# Patient Record
Sex: MALE | Race: WHITE | NOT HISPANIC OR LATINO | URBAN - NONMETROPOLITAN AREA
[De-identification: names, ages, dates, MRNs, and addresses within clinical notes are randomized per-mention and may not be internally consistent; named-entity substitution may affect disease eponyms.]

---

## 2020-01-07 ENCOUNTER — IMPORTED ENCOUNTER (OUTPATIENT)
Dept: URBAN - NONMETROPOLITAN AREA CLINIC 1 | Facility: CLINIC | Age: 50
End: 2020-01-07

## 2020-01-07 PROBLEM — H52.4: Noted: 2020-01-07

## 2020-01-07 PROBLEM — H52.223: Noted: 2020-01-07

## 2020-01-07 PROBLEM — H52.03: Noted: 2020-01-07

## 2020-01-07 PROCEDURE — S0620 ROUTINE OPHTHALMOLOGICAL EXA: HCPCS

## 2020-01-07 NOTE — PATIENT DISCUSSION
Hyperopia OU-Discussed diagnosis with patient. Astigmatism OU-Discussed diagnosis with patient. Presbyopia OU-Discussed diagnosis with patient. **Discussed with patient about LASIK and that he has too much astigmatism for it to be an option. ***Due to patient needing new frames recommend dispense Rx. Updated spec Rx given. Recommend lens that will provide comfort as well as protect safety and health of eyes. *Discussed option of CL fitting as well.

## 2022-04-10 ASSESSMENT — VISUAL ACUITY
OS_SC: 20/30-2
OD_SC: 20/40-1

## 2022-04-10 ASSESSMENT — TONOMETRY
OD_IOP_MMHG: 16
OS_IOP_MMHG: 17